# Patient Record
(demographics unavailable — no encounter records)

---

## 2024-10-29 NOTE — ASSESSMENT
[FreeTextEntry1] : Patient is here today for evaluation of a low free testosterone and feels may have a low thyroid although most recent labs were nl. Will remain off testosterone due to lo enlarged prostate.  Will check testosterone level as well as gonadotropins and thyroid levels Will call for results.  Explained fatigue may be related to recent surgeries.  Given untreated low testosterone we will do a bone density.

## 2024-10-29 NOTE — HISTORY OF PRESENT ILLNESS
[FreeTextEntry1] : The patient was recently found to have a low free testosterone. He requested to be tested because he was feeling more fatigued, complains of cold intolerance and notes change in erectile function. He is currently on medication for hypertension and anti-inflammatories for arthritis. 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 He does see a urologist for a mildly enlarged prostate and now on finasteride. His PSA was elevated so is off testosterone. 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  He feels achy and ias fatigued. Also finds area where hair shaved does not grow back. l

## 2025-04-16 NOTE — HISTORY OF PRESENT ILLNESS
[de-identified] : Patient presents for evaluation of bilateral total knee replacements done 12//2019 and 12/9/2019.  Their current pain is 0 out of 10 in severity but they do notice an intermittent medial based lump near the pes anserine.  No other symptoms at this time.

## 2025-04-16 NOTE — DISCUSSION/SUMMARY
[de-identified] : Now s/p bilateral total knees done in 2019 TKAfor follow-up. Overall doing well   Recommend with continuing activities as tolerated and follow-up in 5 years for 10-year x-rays or sooner should they develop symptoms.  We remain available for any further questions and concerns and instructed patient that we would like to see them if any concerns for infection including fevers, redness, or increased swelling.

## 2025-04-16 NOTE — PHYSICAL EXAM
[de-identified] : General Appearance / Station: Well developed, well nourished, in no acute distress  Orientation: Oriented to person, place, and time Gait & Station: Ambulates without assistive device Neurologic: Normal leg sensation  Cardiovascular: Warm extremity  Lymphatics: No lymphedema  Generalized Ligament Laxity: Normal  Stiffness: Normal   RIGHT HIP: Range of motion: Painless  internal and external rotation of the hip. Strength: Within Normal Limits  Palpation: Nontender  at greater trochanter. Nontender  at SI joint Stinchfield: Negative  FADIR: Negative  YULISA: Negative   RIGHT KNEE: Alignment: Neutral Skin: Healed midline incision without erythema or warmth Effusion: none . Quadriceps: normal . Range of motion: symmetric but painful . PF crepitus: 1+. PF apprehension: none . Patella / Patella Tendon: nontender . Lachman's: negative  Valgus @ 30: negative. Varus @ 30: negative. Posterior drawer: negative. Palpation: Mild tenderness to palpation of pes anserine bursa  LEFT HIP: Range of motion: Painless  internal and external rotation of the hip. Strength: Within Normal Limits  Palpation: Nontender  at greater trochanter. Nontender  at SI joint Stinchfield: Negative  FADIR: Negative  YULISA: Negative  SYMPTOMATIC LEFT KNEE: Alignment: Neutral Skin: Healed midline incision without erythema or warmth Effusion: none . Quadriceps: normal . Range of motion: symmetric but painful . PF crepitus: 1+. PF apprehension: none . Patella / Patella Tendon: nontender . Lachman's: negative  Valgus @ 30: negative. Varus @ 30: negative. Posterior drawer: negative. Palpation: Nontender [de-identified] : Imaging: AP Pelvis show no significant hip joint space narrowing. There are no signs of fracture. The soft tissues appear unremarkable  Imaging: Three views of the left knee show satisfactory placement of a left cemented total knee arthroplasty. There are no changes in alignment of hardware and no signs of radiographic failure compared to previous radiographs.  Imaging: Three views of the right knee show satisfactory placement of a right cemented total knee arthroplasty. There are no changes in alignment of hardware and no signs of radiographic failure compared to previous radiographs.